# Patient Record
Sex: MALE | Race: WHITE | NOT HISPANIC OR LATINO | URBAN - METROPOLITAN AREA
[De-identification: names, ages, dates, MRNs, and addresses within clinical notes are randomized per-mention and may not be internally consistent; named-entity substitution may affect disease eponyms.]

---

## 2024-03-26 ENCOUNTER — TELEPHONE (OUTPATIENT)
Dept: NEUROSURGERY | Facility: CLINIC | Age: 64
End: 2024-03-26
Payer: OTHER GOVERNMENT

## 2024-03-26 NOTE — TELEPHONE ENCOUNTER
Called mobile x 3 (other number not in service) - no answer    Left VM informing of re scheduled time, date, and location for appt with Dr. Mary due to last minute change in Dr. Mary schedule

## 2024-04-23 ENCOUNTER — OFFICE VISIT (OUTPATIENT)
Dept: NEUROSURGERY | Facility: CLINIC | Age: 64
End: 2024-04-23
Payer: OTHER GOVERNMENT

## 2024-04-23 DIAGNOSIS — S04.71XA: Primary | ICD-10-CM

## 2024-04-23 PROCEDURE — 99212 OFFICE O/P EST SF 10 MIN: CPT | Mod: PBBFAC | Performed by: NEUROLOGICAL SURGERY

## 2024-04-23 PROCEDURE — 99999 PR PBB SHADOW E&M-EST. PATIENT-LVL II: CPT | Mod: PBBFAC,,, | Performed by: NEUROLOGICAL SURGERY

## 2024-04-23 PROCEDURE — 99244 OFF/OP CNSLTJ NEW/EST MOD 40: CPT | Mod: S$PBB,,, | Performed by: NEUROLOGICAL SURGERY

## 2024-04-23 RX ORDER — METHYLPREDNISOLONE 4 MG/1
TABLET ORAL
Qty: 21 EACH | Refills: 0 | Status: SHIPPED | OUTPATIENT
Start: 2024-04-23

## 2024-04-23 RX ORDER — LISINOPRIL 5 MG/1
5 TABLET ORAL
COMMUNITY
Start: 2023-11-30

## 2024-04-23 RX ORDER — HYDROCHLOROTHIAZIDE 25 MG/1
25 TABLET ORAL
COMMUNITY
Start: 2023-11-30

## 2024-04-23 RX ORDER — GABAPENTIN 300 MG/1
300 CAPSULE ORAL 3 TIMES DAILY
Qty: 270 CAPSULE | Refills: 3 | Status: SHIPPED | OUTPATIENT
Start: 2024-04-23 | End: 2025-04-23

## 2024-04-23 RX ORDER — ATORVASTATIN CALCIUM 20 MG/1
20 TABLET, FILM COATED ORAL
COMMUNITY
Start: 2023-12-13

## 2024-04-23 NOTE — PROGRESS NOTES
Neurosurgery  History & Physical    SCRIBE #1 NOTE: I, SENG JUSTIN, am scribing for, and in the presence of,  Josue Mary MD. I have scribed the entire note.        SUBJECTIVE:     Chief Complaint: Consultation for brachial plexus injury    History of Present Illness:  63 y.o. male who presents for evaluation of brachial plexus injury. Pt reports that he started having Mohs surgery to his right neck for basal cell carcinoma on 10/12/2023. The nerves in his neck became severed. He started having weakness and right arm pain immediately after the surgery and difficulty raising his right arm up. His symptoms have gotten worse. He was compliant with physical therapy with no relief. He has had two EMG studies done.    Review of patient's allergies indicates:  Not on File  No current outpatient medications on file.     No current facility-administered medications for this visit.     No past medical history on file.  No past surgical history on file.  Family History    None       Social History     Socioeconomic History    Marital status: Unknown     Review of Systems   Musculoskeletal:  Positive for myalgias.   Neurological:  Positive for weakness.   All other systems reviewed and are negative.      OBJECTIVE:     Vital Signs     There is no height or weight on file to calculate BMI.  Physical Exam:    Constitutional: He appears well-developed and well-nourished. He is not diaphoretic. No distress.     Psych/Behavior: He is alert. He is oriented to person, place, and time. He has a normal mood and affect.     ASSESSMENT/PLAN:     63-year-old male with left-sided shoulder and arm weakness from right-sided spinal accessory nerve injury, likely related to Mohs surgery over the right neck area. Based on last EMG, it looked like it may not have been completely severed. I would like to get updated EMG, specifically looking at spinal accessory on the right. I would like original EMG and MRI scan of the brachial plexus and  cervical spine. I would also like pt to see one of my colleagues, Dr. Josh Odom, for another opinion on this complicated peripheral nerve problem.    Scribe Attestation:   Scribe #1: I performed the above scribed service and the documentation accurately describes the services I performed. I attest to the accuracy of the note.    I, ULISSES Mary, personally performed the services described in this documentation. All medical record entries made by the scribe were at my direction and in my presence. I have reviewed the chart and agree that the record reflects my personal performance and is accurate and complete.     Note dictated with voice recognition software, please excuse any grammatical errors.

## 2024-05-02 DIAGNOSIS — S04.71XA: Primary | ICD-10-CM

## 2024-05-30 ENCOUNTER — TELEPHONE (OUTPATIENT)
Dept: NEUROSURGERY | Facility: CLINIC | Age: 64
End: 2024-05-30
Payer: OTHER GOVERNMENT

## 2024-05-30 NOTE — TELEPHONE ENCOUNTER
Called pt regarding referral to Dr. Odom's clinic for another opinion. Also informed pt that  coverage requires referral to be made from PCP's office. Pt verbalized understanding and stated that he would reach out to Dr. Odom's clinic for further clarification and appointment.

## 2024-05-31 ENCOUNTER — TELEPHONE (OUTPATIENT)
Dept: NEUROSURGERY | Facility: CLINIC | Age: 64
End: 2024-05-31
Payer: OTHER GOVERNMENT

## 2024-05-31 NOTE — TELEPHONE ENCOUNTER
----- Message from Anastasiia Hernandez sent at 5/31/2024  8:40 AM CDT -----  Regarding: Pt Advice  Contact: [phone]  Pt calling regarding referral faxed over to Dr. Odom's clinic. States referral was made to Podiatry instead of Neurosurgery. Please call to discuss [phone]

## 2024-06-11 ENCOUNTER — TELEPHONE (OUTPATIENT)
Dept: NEUROSURGERY | Facility: CLINIC | Age: 64
End: 2024-06-11
Payer: OTHER GOVERNMENT

## 2024-06-11 ENCOUNTER — TELEPHONE (OUTPATIENT)
Dept: NEUROLOGY | Facility: CLINIC | Age: 64
End: 2024-06-11
Payer: OTHER GOVERNMENT

## 2024-06-11 NOTE — TELEPHONE ENCOUNTER
Staff spoke to patient, explaining to patient we need a authorized referral from [facility] before his appt.

## 2024-06-11 NOTE — TELEPHONE ENCOUNTER
Called patient back in reference to faxing EMG orders to insurance. Patient did not have insurance fax number. Let patient know that I will reach out to insurance to see.

## 2024-06-13 ENCOUNTER — TELEPHONE (OUTPATIENT)
Dept: NEUROSURGERY | Facility: CLINIC | Age: 64
End: 2024-06-13
Payer: OTHER GOVERNMENT

## 2024-06-13 NOTE — TELEPHONE ENCOUNTER
Called to speak with patient regarding insurance approval for scheduled EMG. Explained to patient that the representative from [facility] (patient's insurance) stated that no pre-authorization or approval was needed for patient to have EMG. Patient verbalized understanding. Advised patient to call back if needed and reach out to insurance company if otherwise.

## 2024-06-13 NOTE — TELEPHONE ENCOUNTER
Called and spoke with patient's  ([facility]) regarding patient needing approval for ordered EMG and for the order to be faxed. Representative stated that based on the two CPT codes on the order, it does not require prior authorization or need approval. Verbalized understanding and will reach out to the patient with new update.

## 2024-07-02 ENCOUNTER — PROCEDURE VISIT (OUTPATIENT)
Dept: NEUROLOGY | Facility: CLINIC | Age: 64
End: 2024-07-02
Attending: NEUROLOGICAL SURGERY
Payer: OTHER GOVERNMENT

## 2024-07-02 DIAGNOSIS — S04.71XA: ICD-10-CM

## 2024-07-02 PROCEDURE — 95913 NRV CNDJ TEST 13/> STUDIES: CPT | Mod: PBBFAC,PN | Performed by: PHYSICAL MEDICINE & REHABILITATION

## 2024-07-02 PROCEDURE — 95886 MUSC TEST DONE W/N TEST COMP: CPT | Mod: PBBFAC,PN | Performed by: PHYSICAL MEDICINE & REHABILITATION

## 2024-07-02 NOTE — PROCEDURES
Test Date:  2024    Patient: agustin ba : 1960 Physician: John Fuller D.O.   ID#: [id number] SEX: Male Ref. Phys: Josue Mary MD     HPI: Agustin Ba is a 63 y.o.male who presents for NCS/EMG to evaluate for spinal accessory neuropathy vs brachial plexopathy for symptoms of weakness and atrophy of shoulder without n/t.  Symptoms began immediately after Mohs procedure of neck 10/2023.  Exam shows marked atrophy of the trapezius.    PROCEDURE:  Prior to the procedure, the procedure was discussed in detail with the patient.  All questions were answered, and verbal consent was obtained.  For nerve conduction studies, a combination of surface electrodes, bar electrodes, and/or ring electrodes were used as needed.  For needle EMG, each site was cleaned and prepped in usual fashion with an alcohol pad.  A monopolar needle (28G) was used.  There was no significant bleeding, and bandages were applied as needed.  The procedure was tolerated without adverse effect.  The patient was instructed on post-procedure care including ice if needed for 10-15 minutes up to 4 times/day for any sore muscles.  I discussed with the patient that the data would be reviewed and a report sent to the referring provider, where any follow up questions regarding next steps should be directed.        NCV & EMG Findings:  All nerve conduction studies (as indicated in the following tables) were within normal limits.  The right trapezius muscle showed 3+ PSW and fibs with no volitional activation.    All remaining examined muscles (as indicated in the following table) showed no evidence of electrical instability.      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  There was no volitional activation of the trapezius muscle on needle EMG.  There are no signs of collateral sprouting or axonal regrowth (I.e. healing) on needle EMG.    No electrophysiologic evidence of a right brachial  plexopathy        ___________________________  John Fuller D.O.        NCS+  Motor Nerve Results      Latency Amplitude F-Lat Segment Distance CV Comment   Site (ms) Norm (mV) Norm (ms)  (cm) (m/s) Norm    Left Median (APB)   Wrist 3.7  < 4.7 7.9  > 3.8         Elbow 7.9 - 8.6 -  Elbow-Wrist 24 57  > 47    Right Median (APB)   Wrist 4.2  < 4.7 6.3  > 3.8         Elbow 8.7 - 4.6 -  Elbow-Wrist 26 58  > 47    Left Ulnar (ADM)   Wrist 3.0  < 3.7 9.4  > 3.0         Bel Elbow 7.3 - 8.2 -  Bel Elbow-Wrist 24 56  > 52    Right Ulnar (ADM)   Wrist 2.9  < 3.7 10.5  > 3.0         Bel Elbow 7.2 - 9.1 -  Bel Elbow-Wrist 26 60  > 52    Abv Elbow 8.4 - 8.8 -  Abv Elbow-Bel Elbow 9 75  > 43      Sensory Nerve Results      Start Lat Latency (Peak) Amplitude (P-P) Segment Distance Start CV Comment   Site (ms) Norm (ms) (µV) Norm  (cm) (m/s) Norm    Left Median (Rec:Dig II)   Wrist 2.8  < 3.3 3.8 31  > 8 Wrist-Dig II 14 50  > 42    Right Median (Rec:Dig II)   Wrist 3.1  < 3.3 4.0 27  > 8 Wrist-Dig II 14 45  > 42    Left Ulnar (Rec:Dig V)   Wrist 2.4  < 3.1 3.3 21  > 4 Wrist-Dig V 14 58  > 45    Right Ulnar (Rec:Dig V)   Wrist 2.3  < 3.1 3.1 19  > 4 Wrist-Dig V 14 61  > 45    Left Dorsal Ulnar Cutaneous (Rec:Dorsum 5th MC)   Wrist 1.65 - 2.4 10 - Wrist-Dorsum 5th MC - - -    Right Dorsal Ulnar Cutaneous (Rec:Dorsum 5th MC)   Wrist 1.53 - 2.2 14 - Wrist-Dorsum 5th MC 10 65 -    Left Radial (Rec:Wrist)   Forearm 1.55  < 2.2 2.1 16  > 11 Forearm-Wrist 10 65 -    Right Radial (Rec:Wrist)   Forearm 1.30  < 2.2 1.83 18  > 11 Forearm-Wrist 10 77 -    Right Lateral Antebrachial Cutaneous (Rec:Lat Forearm)   Lat Biceps 1.65 - 2.0 37  > 6 Lat Biceps-Lat Forearm - - -      EMG+     Side Muscle Nerve Root Ins Act Fibs Psw Amp Dur Poly Recrt Int Pat Comment   Right Deltoid Axillary C5-C6 Nml Nml Nml Nml Nml 0 Nml Nml    Right Biceps Musculocut C5-C6 Nml Nml Nml Nml Nml 0 Nml Nml    Right Infraspin Suprascapular C5-C6 Nml Nml Nml Nml Nml 0  Nml Nml    Right Supraspin Suprascapular C5-C6 Nml Nml Nml Nml Nml 0 Nml Nml    Right Triceps Radial C6-C8 Nml Nml Nml Nml Nml 0 Nml Nml    Right Trapezius Spinal Accessory CN XI-C3 *INC 3+ 3+      No volitional activation           Waveforms:    Motor              Sensory

## 2024-07-11 ENCOUNTER — TELEPHONE (OUTPATIENT)
Dept: NEUROSURGERY | Facility: CLINIC | Age: 64
End: 2024-07-11
Payer: OTHER GOVERNMENT

## 2024-07-11 NOTE — TELEPHONE ENCOUNTER
Let patient know that we will send referral over to Dr. Odom's office for evaluation.     ----- Message from Radha Patino sent at 7/11/2024 11:59 AM CDT -----  Regarding: Results  Contact: [phone]  Hi, pt called to request a call from the office to discuss the results of the EMG. Pls call the pt at  [phone].  Thank you.